# Patient Record
Sex: MALE | Race: WHITE | NOT HISPANIC OR LATINO | Employment: STUDENT | URBAN - METROPOLITAN AREA
[De-identification: names, ages, dates, MRNs, and addresses within clinical notes are randomized per-mention and may not be internally consistent; named-entity substitution may affect disease eponyms.]

---

## 2023-02-03 PROBLEM — G47.00 INSOMNIA: Status: ACTIVE | Noted: 2023-02-03

## 2023-02-03 PROBLEM — F41.9 ANXIETY DISORDER: Status: ACTIVE | Noted: 2023-02-03

## 2023-02-03 PROBLEM — K58.0 IRRITABLE BOWEL SYNDROME WITH DIARRHEA: Status: ACTIVE | Noted: 2023-02-03

## 2023-02-03 PROBLEM — F12.20 CANNABIS DEPENDENCE (MULTI): Status: ACTIVE | Noted: 2023-02-03

## 2023-02-03 PROBLEM — M25.559 HIP PAIN: Status: ACTIVE | Noted: 2023-02-03

## 2023-02-03 PROBLEM — L65.9 ALOPECIA: Status: ACTIVE | Noted: 2023-02-03

## 2023-02-03 RX ORDER — LORAZEPAM 1 MG/1
TABLET ORAL
COMMUNITY
Start: 2021-07-15 | End: 2024-03-21 | Stop reason: SDUPTHER

## 2023-02-03 RX ORDER — LAMOTRIGINE 100 MG/1
TABLET ORAL
COMMUNITY
Start: 2021-05-13

## 2023-02-03 RX ORDER — CLINDAMYCIN AND BENZOYL PEROXIDE 10; 50 MG/G; MG/G
GEL TOPICAL DAILY
COMMUNITY
Start: 2021-06-09

## 2023-02-03 RX ORDER — EMTRICITABINE AND TENOFOVIR DISOPROXIL FUMARATE 200; 300 MG/1; MG/1
1 TABLET, FILM COATED ORAL DAILY
COMMUNITY
Start: 2022-09-23

## 2023-02-03 RX ORDER — FINASTERIDE 5 MG/1
0.25 TABLET, FILM COATED ORAL DAILY
COMMUNITY
Start: 2021-07-15 | End: 2023-06-26

## 2023-02-03 RX ORDER — VENLAFAXINE HYDROCHLORIDE 150 MG/1
CAPSULE, EXTENDED RELEASE ORAL
COMMUNITY
Start: 2020-09-11

## 2023-03-24 DIAGNOSIS — L65.9 NONSCARRING HAIR LOSS, UNSPECIFIED: ICD-10-CM

## 2023-05-12 RX ORDER — FINASTERIDE 5 MG/1
TABLET, FILM COATED ORAL
Qty: 22 TABLET | Refills: 2 | OUTPATIENT
Start: 2023-05-12

## 2023-06-26 DIAGNOSIS — L65.9 ALOPECIA: Primary | ICD-10-CM

## 2023-06-26 RX ORDER — FINASTERIDE 5 MG/1
TABLET, FILM COATED ORAL
Qty: 22 TABLET | Refills: 0 | Status: SHIPPED | OUTPATIENT
Start: 2023-06-26 | End: 2024-03-28 | Stop reason: SDUPTHER

## 2023-07-06 ENCOUNTER — TELEPHONE (OUTPATIENT)
Dept: PRIMARY CARE | Facility: CLINIC | Age: 23
End: 2023-07-06
Payer: COMMERCIAL

## 2023-07-06 NOTE — TELEPHONE ENCOUNTER
Pt has been sick for 10 days now. He had his finger pricked today to test for mono and it came back negative. He also has been tested for covid and strep and those were both negative. His many sx's are exhaustion, body aches, sore throat, and fever. Please advise.

## 2023-08-04 DIAGNOSIS — Z00.00 PREVENTATIVE HEALTH CARE: Primary | ICD-10-CM

## 2023-09-25 DIAGNOSIS — L65.9 ALOPECIA: ICD-10-CM

## 2023-10-13 RX ORDER — FINASTERIDE 5 MG/1
TABLET, FILM COATED ORAL
Qty: 22 TABLET | Refills: 0 | OUTPATIENT
Start: 2023-10-13

## 2023-10-22 DIAGNOSIS — L65.9 ALOPECIA: ICD-10-CM

## 2023-10-22 DIAGNOSIS — Z79.899 OTHER LONG TERM (CURRENT) DRUG THERAPY: ICD-10-CM

## 2023-10-25 RX ORDER — EMTRICITABINE AND TENOFOVIR DISOPROXIL FUMARATE 200; 300 MG/1; MG/1
1 TABLET, FILM COATED ORAL DAILY
Qty: 30 TABLET | Refills: 5 | OUTPATIENT
Start: 2023-10-25

## 2023-10-25 RX ORDER — FINASTERIDE 5 MG/1
TABLET, FILM COATED ORAL
Qty: 22 TABLET | Refills: 0 | OUTPATIENT
Start: 2023-10-25

## 2024-03-21 ENCOUNTER — TELEPHONE (OUTPATIENT)
Dept: PRIMARY CARE | Facility: CLINIC | Age: 24
End: 2024-03-21
Payer: COMMERCIAL

## 2024-03-21 DIAGNOSIS — F41.9 ANXIETY DISORDER, UNSPECIFIED TYPE: Primary | ICD-10-CM

## 2024-03-21 RX ORDER — LORAZEPAM 1 MG/1
1 TABLET ORAL 2 TIMES DAILY PRN
Qty: 14 TABLET | Refills: 0 | Status: SHIPPED | OUTPATIENT
Start: 2024-03-26

## 2024-03-28 DIAGNOSIS — L65.9 ALOPECIA: ICD-10-CM

## 2024-03-28 RX ORDER — FINASTERIDE 5 MG/1
TABLET, FILM COATED ORAL
Qty: 22 TABLET | Refills: 0 | Status: SHIPPED | OUTPATIENT
Start: 2024-03-28

## 2024-04-19 ENCOUNTER — APPOINTMENT (OUTPATIENT)
Dept: PRIMARY CARE | Facility: CLINIC | Age: 24
End: 2024-04-19
Payer: COMMERCIAL

## 2024-06-20 DIAGNOSIS — L65.9 ALOPECIA: ICD-10-CM

## 2024-06-20 RX ORDER — FINASTERIDE 5 MG/1
TABLET, FILM COATED ORAL
Qty: 22 TABLET | Refills: 0 | Status: SHIPPED | OUTPATIENT
Start: 2024-06-20

## 2024-06-24 ENCOUNTER — APPOINTMENT (OUTPATIENT)
Dept: PRIMARY CARE | Facility: CLINIC | Age: 24
End: 2024-06-24
Payer: COMMERCIAL

## 2024-06-24 VITALS
BODY MASS INDEX: 24.4 KG/M2 | DIASTOLIC BLOOD PRESSURE: 86 MMHG | HEART RATE: 104 BPM | HEIGHT: 68 IN | WEIGHT: 161 LBS | OXYGEN SATURATION: 99 % | RESPIRATION RATE: 12 BRPM | SYSTOLIC BLOOD PRESSURE: 128 MMHG

## 2024-06-24 DIAGNOSIS — F12.20 CANNABIS DEPENDENCE (MULTI): ICD-10-CM

## 2024-06-24 DIAGNOSIS — F41.9 ANXIETY DISORDER, UNSPECIFIED TYPE: ICD-10-CM

## 2024-06-24 DIAGNOSIS — F17.290 VAPING NICOTINE DEPENDENCE, TOBACCO PRODUCT: ICD-10-CM

## 2024-06-24 DIAGNOSIS — Z00.00 HEALTHCARE MAINTENANCE: Primary | ICD-10-CM

## 2024-06-24 PROCEDURE — 90715 TDAP VACCINE 7 YRS/> IM: CPT | Performed by: FAMILY MEDICINE

## 2024-06-24 PROCEDURE — 99395 PREV VISIT EST AGE 18-39: CPT | Performed by: FAMILY MEDICINE

## 2024-06-24 PROCEDURE — 1036F TOBACCO NON-USER: CPT | Performed by: FAMILY MEDICINE

## 2024-06-24 PROCEDURE — 90471 IMMUNIZATION ADMIN: CPT | Performed by: FAMILY MEDICINE

## 2024-06-24 RX ORDER — NICOTINE 7MG/24HR
1 PATCH, TRANSDERMAL 24 HOURS TRANSDERMAL EVERY 24 HOURS
Qty: 60 PATCH | Refills: 0 | Status: SHIPPED | OUTPATIENT
Start: 2024-06-24 | End: 2024-07-08

## 2024-06-24 ASSESSMENT — ENCOUNTER SYMPTOMS
ARTHRALGIAS: 0
COUGH: 0
HEADACHES: 0
FATIGUE: 0
DYSURIA: 0
CONSTIPATION: 0
NERVOUS/ANXIOUS: 1
APPETITE CHANGE: 0
WEAKNESS: 0
DIFFICULTY URINATING: 0
CHEST TIGHTNESS: 0
FEVER: 0
BLOOD IN STOOL: 0
EYE PAIN: 0
DIARRHEA: 0
ABDOMINAL PAIN: 0
BRUISES/BLEEDS EASILY: 0
BACK PAIN: 0
DIZZINESS: 0
SORE THROAT: 0
SHORTNESS OF BREATH: 0
DYSPHORIC MOOD: 0
CHILLS: 0
EYE REDNESS: 0
ABDOMINAL DISTENTION: 0
ADENOPATHY: 0

## 2024-06-24 NOTE — PROGRESS NOTES
"Subjective   Patient ID: Abhay Osborne is a 24 y.o. male who presents for Annual Exam.  PMHX, PSHx, Fam hx, and Social hx reviewed.   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   Smoker - vaping and smokes MJ daily  Lung CT/screening - NA  AAA screening - NA  Alcohol use - 0-2 drinks per week  Exercising 2 days per week.   Sexually active - yes, no issues.  Colonoscopy NA             Review of Systems   Constitutional:  Negative for appetite change, chills, fatigue and fever.   HENT:  Negative for congestion, hearing loss and sore throat.    Eyes:  Negative for pain, redness and visual disturbance.   Respiratory:  Negative for cough, chest tightness and shortness of breath.    Cardiovascular:  Negative for chest pain and leg swelling.   Gastrointestinal:  Negative for abdominal distention, abdominal pain, blood in stool, constipation and diarrhea.   Genitourinary:  Negative for difficulty urinating and dysuria.   Musculoskeletal:  Negative for arthralgias and back pain.   Skin:  Negative for rash.   Neurological:  Negative for dizziness, weakness and headaches.   Hematological:  Negative for adenopathy. Does not bruise/bleed easily.   Psychiatric/Behavioral:  Negative for dysphoric mood. The patient is nervous/anxious (doing well on current meds per psychiatry).        Objective   /86   Pulse 104   Resp 12   Ht 1.715 m (5' 7.5\")   Wt 73 kg (161 lb)   SpO2 99%   BMI 24.84 kg/m²    Physical Exam  Constitutional:       General: He is not in acute distress.     Appearance: Normal appearance. He is not ill-appearing.   HENT:      Head: Normocephalic and atraumatic.      Right Ear: Tympanic membrane, ear canal and external ear normal.      Left Ear: Tympanic membrane, ear canal and external ear normal.      Nose: " Nose normal.      Mouth/Throat:      Mouth: Mucous membranes are moist.      Pharynx: No oropharyngeal exudate or posterior oropharyngeal erythema.   Eyes:      Extraocular Movements: Extraocular movements intact.      Conjunctiva/sclera: Conjunctivae normal.      Pupils: Pupils are equal, round, and reactive to light.   Neck:      Thyroid: No thyroid mass or thyromegaly.      Vascular: No carotid bruit.   Cardiovascular:      Rate and Rhythm: Normal rate and regular rhythm.      Heart sounds: Normal heart sounds. No murmur heard.  Pulmonary:      Effort: Pulmonary effort is normal.      Breath sounds: Normal breath sounds. No wheezing, rhonchi or rales.   Abdominal:      General: Bowel sounds are normal. There is no distension.      Palpations: Abdomen is soft. There is no mass.      Tenderness: There is no abdominal tenderness.   Musculoskeletal:         General: No swelling or deformity.      Cervical back: Neck supple. No tenderness.   Lymphadenopathy:      Cervical: No cervical adenopathy.   Skin:     General: Skin is warm and dry.      Findings: No lesion or rash.   Neurological:      Mental Status: He is alert and oriented to person, place, and time.      Sensory: No sensory deficit.      Motor: No weakness.      Coordination: Coordination normal.      Deep Tendon Reflexes: Reflexes normal.   Psychiatric:         Mood and Affect: Mood normal.         Behavior: Behavior normal.         Judgment: Judgment normal.           Assessment/Plan   Diagnoses and all orders for this visit:  Healthcare maintenance - TDAP shot given today, other vaccines current. Checking fasting labs.   Vaping nicotine dependence, tobacco product - discussed medication and nicotine replacement as options. Giving low dose nicotine patch, may also use gum/lozenge as needed for breakthru cravings.   Cannabis dependence  - recommend setting goals for cutting back/quitting  Anxiety disorder -stable on current meds, per psychiatry.  Off Prep  currently in monogamous relationship. Call if wanting to restart.  Follow up in 1 year, physical

## 2024-06-24 NOTE — PROGRESS NOTES
"Subjective   Patient ID: Abhay Osborne is a 24 y.o. male who presents for Annual Exam.    HPI     Review of Systems    Objective   /86   Pulse 104   Resp 12   Ht 1.715 m (5' 7.5\")   Wt 73 kg (161 lb)   SpO2 99%   BMI 24.84 kg/m²     Physical Exam    Assessment/Plan          "

## 2024-09-19 DIAGNOSIS — L65.9 ALOPECIA: ICD-10-CM

## 2024-09-20 RX ORDER — FINASTERIDE 5 MG/1
TABLET, FILM COATED ORAL
Qty: 22 TABLET | Refills: 2 | Status: SHIPPED | OUTPATIENT
Start: 2024-09-20

## 2025-05-06 DIAGNOSIS — L65.9 ALOPECIA: ICD-10-CM

## 2025-05-19 RX ORDER — FINASTERIDE 5 MG/1
TABLET, FILM COATED ORAL
Qty: 22 TABLET | Refills: 2 | OUTPATIENT
Start: 2025-05-19

## 2025-06-04 DIAGNOSIS — L65.9 ALOPECIA: ICD-10-CM

## 2025-06-10 RX ORDER — FINASTERIDE 5 MG/1
TABLET, FILM COATED ORAL
Qty: 22 TABLET | Refills: 2 | OUTPATIENT
Start: 2025-06-10

## 2025-06-23 DIAGNOSIS — L65.9 ALOPECIA: ICD-10-CM

## 2025-06-23 RX ORDER — FINASTERIDE 5 MG/1
TABLET, FILM COATED ORAL
Qty: 22 TABLET | Refills: 0 | Status: SHIPPED | OUTPATIENT
Start: 2025-06-23

## 2025-06-26 ENCOUNTER — APPOINTMENT (OUTPATIENT)
Dept: PRIMARY CARE | Facility: CLINIC | Age: 25
End: 2025-06-26
Payer: COMMERCIAL

## 2025-07-29 ENCOUNTER — APPOINTMENT (OUTPATIENT)
Dept: PRIMARY CARE | Facility: CLINIC | Age: 25
End: 2025-07-29
Payer: COMMERCIAL

## 2025-07-29 VITALS
HEIGHT: 68 IN | OXYGEN SATURATION: 98 % | HEART RATE: 93 BPM | WEIGHT: 173 LBS | SYSTOLIC BLOOD PRESSURE: 122 MMHG | RESPIRATION RATE: 16 BRPM | DIASTOLIC BLOOD PRESSURE: 70 MMHG | BODY MASS INDEX: 26.22 KG/M2

## 2025-07-29 DIAGNOSIS — F41.9 ANXIETY DISORDER, UNSPECIFIED TYPE: ICD-10-CM

## 2025-07-29 DIAGNOSIS — F12.20 CANNABIS DEPENDENCE: ICD-10-CM

## 2025-07-29 DIAGNOSIS — F33.41 RECURRENT MAJOR DEPRESSIVE DISORDER, IN PARTIAL REMISSION: ICD-10-CM

## 2025-07-29 DIAGNOSIS — L65.9 ALOPECIA: ICD-10-CM

## 2025-07-29 DIAGNOSIS — K58.0 IRRITABLE BOWEL SYNDROME WITH DIARRHEA: ICD-10-CM

## 2025-07-29 DIAGNOSIS — Z00.00 HEALTHCARE MAINTENANCE: Primary | ICD-10-CM

## 2025-07-29 DIAGNOSIS — F17.290 VAPING NICOTINE DEPENDENCE, TOBACCO PRODUCT: ICD-10-CM

## 2025-07-29 PROCEDURE — 3008F BODY MASS INDEX DOCD: CPT | Performed by: FAMILY MEDICINE

## 2025-07-29 PROCEDURE — 99395 PREV VISIT EST AGE 18-39: CPT | Performed by: FAMILY MEDICINE

## 2025-07-29 RX ORDER — FINASTERIDE 5 MG/1
TABLET, FILM COATED ORAL
Qty: 22 TABLET | Refills: 3 | Status: SHIPPED | OUTPATIENT
Start: 2025-07-29

## 2025-07-29 ASSESSMENT — PATIENT HEALTH QUESTIONNAIRE - PHQ9
3. TROUBLE FALLING OR STAYING ASLEEP OR SLEEPING TOO MUCH: NEARLY EVERY DAY
9. THOUGHTS THAT YOU WOULD BE BETTER OFF DEAD, OR OF HURTING YOURSELF: NOT AT ALL
SUM OF ALL RESPONSES TO PHQ QUESTIONS 1-9: 11
2. FEELING DOWN, DEPRESSED OR HOPELESS: MORE THAN HALF THE DAYS
5. POOR APPETITE OR OVEREATING: NOT AT ALL
6. FEELING BAD ABOUT YOURSELF - OR THAT YOU ARE A FAILURE OR HAVE LET YOURSELF OR YOUR FAMILY DOWN: SEVERAL DAYS
4. FEELING TIRED OR HAVING LITTLE ENERGY: MORE THAN HALF THE DAYS
SUM OF ALL RESPONSES TO PHQ9 QUESTIONS 1 AND 2: 3
8. MOVING OR SPEAKING SO SLOWLY THAT OTHER PEOPLE COULD HAVE NOTICED. OR THE OPPOSITE, BEING SO FIGETY OR RESTLESS THAT YOU HAVE BEEN MOVING AROUND A LOT MORE THAN USUAL: SEVERAL DAYS
1. LITTLE INTEREST OR PLEASURE IN DOING THINGS: SEVERAL DAYS
7. TROUBLE CONCENTRATING ON THINGS, SUCH AS READING THE NEWSPAPER OR WATCHING TELEVISION: SEVERAL DAYS

## 2025-07-29 ASSESSMENT — ANXIETY QUESTIONNAIRES
1. FEELING NERVOUS, ANXIOUS, OR ON EDGE: SEVERAL DAYS
7. FEELING AFRAID AS IF SOMETHING AWFUL MIGHT HAPPEN: NEARLY EVERY DAY
IF YOU CHECKED OFF ANY PROBLEMS ON THIS QUESTIONNAIRE, HOW DIFFICULT HAVE THESE PROBLEMS MADE IT FOR YOU TO DO YOUR WORK, TAKE CARE OF THINGS AT HOME, OR GET ALONG WITH OTHER PEOPLE: SOMEWHAT DIFFICULT
3. WORRYING TOO MUCH ABOUT DIFFERENT THINGS: MORE THAN HALF THE DAYS
GAD7 TOTAL SCORE: 12
2. NOT BEING ABLE TO STOP OR CONTROL WORRYING: SEVERAL DAYS
6. BECOMING EASILY ANNOYED OR IRRITABLE: SEVERAL DAYS
5. BEING SO RESTLESS THAT IT IS HARD TO SIT STILL: MORE THAN HALF THE DAYS
4. TROUBLE RELAXING: MORE THAN HALF THE DAYS

## 2025-07-29 ASSESSMENT — ENCOUNTER SYMPTOMS
ARTHRALGIAS: 0
ADENOPATHY: 0
DIARRHEA: 0
ABDOMINAL DISTENTION: 0
DIFFICULTY URINATING: 0
SORE THROAT: 0
CONSTIPATION: 0
NERVOUS/ANXIOUS: 0
CHILLS: 0
COUGH: 0
BACK PAIN: 0
APPETITE CHANGE: 0
BLOOD IN STOOL: 0
FEVER: 0
SHORTNESS OF BREATH: 0
EYE PAIN: 0
WEAKNESS: 0
CHEST TIGHTNESS: 0
BRUISES/BLEEDS EASILY: 0
DYSURIA: 0
EYE REDNESS: 0
FATIGUE: 0
DIZZINESS: 0
DYSPHORIC MOOD: 0
HEADACHES: 0
ABDOMINAL PAIN: 0

## 2025-07-29 NOTE — PROGRESS NOTES
"Subjective   Patient ID: Abhay Osborne is a 25 y.o. male who presents for Annual Exam.  PMHX, PSHx, Fam hx, and Social hx reviewed.   Vaccines - current  Dentist seen at least yearly no  Vision concerns none  Hearing concerns none  Diet is not overall healthy.   Smoker - no cigarettes, he's still vaping, no ready to quit  Lung CT/screening - NA  AAA screening - NA  Alcohol use - averages 0-2 drinks per week  Exercising 3 days per week.   Sexually active - yes, no issues. Doing well on Truvada.  Colonoscopy NA    Pt has chronic and stable depression / anxiety. Work has been stressful.  Pt has supportive family/friends.   Pt is seeing a counselor and psychiatry   Taking medication and it is helping.   Mood, energy, motivation, interests are low. Sleeping about 7hrs per night. Appetite are adequate. Anxiety is bothersome  Pt denies suicidal ideations.              Review of Systems   Constitutional:  Negative for appetite change, chills, fatigue and fever.   HENT:  Negative for congestion, hearing loss and sore throat.    Eyes:  Negative for pain, redness and visual disturbance.   Respiratory:  Negative for cough, chest tightness and shortness of breath.    Cardiovascular:  Negative for chest pain and leg swelling.   Gastrointestinal:  Negative for abdominal distention, abdominal pain, blood in stool, constipation and diarrhea.   Genitourinary:  Negative for difficulty urinating and dysuria.   Musculoskeletal:  Negative for arthralgias and back pain.   Skin:  Negative for rash.   Neurological:  Negative for dizziness, weakness and headaches.   Hematological:  Negative for adenopathy. Does not bruise/bleed easily.   Psychiatric/Behavioral:  Negative for dysphoric mood. The patient is not nervous/anxious.        Objective   /70   Pulse 93   Resp 16   Ht 1.715 m (5' 7.5\")   Wt 78.5 kg (173 lb)   SpO2 98%   BMI 26.70 kg/m²    Physical Exam  Constitutional:       General: He is not in acute distress.     " Appearance: Normal appearance. He is not ill-appearing.   HENT:      Head: Normocephalic and atraumatic.      Right Ear: Tympanic membrane, ear canal and external ear normal.      Left Ear: Tympanic membrane, ear canal and external ear normal.      Nose: Nose normal.      Mouth/Throat:      Mouth: Mucous membranes are moist.      Pharynx: No oropharyngeal exudate or posterior oropharyngeal erythema.     Eyes:      Extraocular Movements: Extraocular movements intact.      Conjunctiva/sclera: Conjunctivae normal.      Pupils: Pupils are equal, round, and reactive to light.     Neck:      Thyroid: No thyroid mass or thyromegaly.      Vascular: No carotid bruit.     Cardiovascular:      Rate and Rhythm: Normal rate and regular rhythm.      Heart sounds: Normal heart sounds. No murmur heard.  Pulmonary:      Breath sounds: Normal breath sounds. No wheezing, rhonchi or rales.   Abdominal:      General: Bowel sounds are normal. There is no distension.      Palpations: Abdomen is soft. There is no mass.      Tenderness: There is no abdominal tenderness.     Musculoskeletal:         General: No swelling or deformity.      Cervical back: Neck supple. No tenderness.   Lymphadenopathy:      Cervical: No cervical adenopathy.     Skin:     General: Skin is warm and dry.      Findings: No lesion or rash.     Neurological:      Mental Status: He is alert and oriented to person, place, and time.      Sensory: No sensory deficit.      Motor: No weakness.      Coordination: Coordination normal.      Deep Tendon Reflexes: Reflexes normal.     Psychiatric:         Mood and Affect: Mood normal.         Behavior: Behavior normal.         Judgment: Judgment normal.       Assessment/Plan   Diagnoses and all orders for this visit:  Healthcare maintenance - vaccines current. Discussed and he declines checking labs. Monogamous and off Truvada, call if wanting to restart.  Depression/Anxiety/Irritable bowel syndrome - stable, monitoring with  psychiatry and psychology  Cannabis dependence/Vaping nicotine  - discuss eventual goals for quitting. Call if wanting to try nicotine patch to help with quitting.    Follow up in 1 year for physical

## 2025-07-29 NOTE — PROGRESS NOTES
"Subjective   Patient ID: Abhay Osborne is a 25 y.o. male who presents for Annual Exam.    HPI     Review of Systems    Objective   /70   Pulse 93   Resp 16   Ht 1.715 m (5' 7.5\")   Wt 78.5 kg (173 lb)   SpO2 98%   BMI 26.70 kg/m²     Physical Exam    Assessment/Plan          "

## 2026-07-30 ENCOUNTER — APPOINTMENT (OUTPATIENT)
Dept: PRIMARY CARE | Facility: CLINIC | Age: 26
End: 2026-07-30
Payer: COMMERCIAL